# Patient Record
Sex: FEMALE | URBAN - METROPOLITAN AREA
[De-identification: names, ages, dates, MRNs, and addresses within clinical notes are randomized per-mention and may not be internally consistent; named-entity substitution may affect disease eponyms.]

---

## 2019-01-10 ENCOUNTER — APPOINTMENT (RX ONLY)
Dept: URBAN - METROPOLITAN AREA CLINIC 45 | Facility: CLINIC | Age: 24
Setting detail: DERMATOLOGY
End: 2019-01-10

## 2019-01-10 DIAGNOSIS — L40.0 PSORIASIS VULGARIS: ICD-10-CM

## 2019-01-10 DIAGNOSIS — L40.59 OTHER PSORIATIC ARTHROPATHY: ICD-10-CM

## 2019-01-10 PROBLEM — F41.9 ANXIETY DISORDER, UNSPECIFIED: Status: ACTIVE | Noted: 2019-01-10

## 2019-01-10 PROBLEM — F32.9 MAJOR DEPRESSIVE DISORDER, SINGLE EPISODE, UNSPECIFIED: Status: ACTIVE | Noted: 2019-01-10

## 2019-01-10 PROCEDURE — ? PRESCRIPTION

## 2019-01-10 PROCEDURE — ? OTHER

## 2019-01-10 PROCEDURE — ? COUNSELING

## 2019-01-10 PROCEDURE — ? ORDER TESTS

## 2019-01-10 PROCEDURE — ? HUMIRA INITIATION

## 2019-01-10 PROCEDURE — ? TREATMENT REGIMEN

## 2019-01-10 PROCEDURE — 99203 OFFICE O/P NEW LOW 30 MIN: CPT

## 2019-01-10 RX ORDER — CLOBETASOL PROPIONATE 0.46 MG/ML
1 SOLUTION TOPICAL QHS
Qty: 1 | Refills: 1 | Status: ERX | COMMUNITY
Start: 2019-01-10

## 2019-01-10 RX ORDER — TRIAMCINOLONE ACETONIDE 1 MG/G
1 CREAM TOPICAL BID
Qty: 1 | Refills: 2 | Status: ERX | COMMUNITY
Start: 2019-01-10

## 2019-01-10 RX ADMIN — CLOBETASOL PROPIONATE 1: 0.46 SOLUTION TOPICAL at 00:00

## 2019-01-10 RX ADMIN — TRIAMCINOLONE ACETONIDE 1: 1 CREAM TOPICAL at 00:00

## 2019-01-10 ASSESSMENT — LOCATION DETAILED DESCRIPTION DERM
LOCATION DETAILED: RIGHT THIRD METACARPOPHALANGEAL JOINT
LOCATION DETAILED: LEFT THIRD METACARPOPHALANGEAL JOINT
LOCATION DETAILED: LEFT DISTAL PRETIBIAL REGION
LOCATION DETAILED: RIGHT TIBIOTALAR JOINT
LOCATION DETAILED: LEFT CAPITOTRAPEZOID JOINT
LOCATION DETAILED: LEFT LABIUM MAJUS
LOCATION DETAILED: LEFT LATERAL ABDOMEN
LOCATION DETAILED: LEFT SECOND PROXIMAL INTERPHALANGEAL JOINT
LOCATION DETAILED: RIGHT LABIUM MAJUS
LOCATION DETAILED: RIGHT SECOND METATARSOPHALANGEAL JOINT
LOCATION DETAILED: LEFT SUPERIOR PARIETAL SCALP
LOCATION DETAILED: LEFT PATELLOFEMORAL JOINT
LOCATION DETAILED: RIGHT LUNOTRIQUETRAL JOINT
LOCATION DETAILED: RIGHT DISTAL PRETIBIAL REGION
LOCATION DETAILED: LEFT TIBIOTALAR JOINT

## 2019-01-10 ASSESSMENT — LOCATION SIMPLE DESCRIPTION DERM
LOCATION SIMPLE: RIGHT LUNOTRIQUETRAL
LOCATION SIMPLE: RIGHT TIBIOTALAR
LOCATION SIMPLE: LEFT SECOND PIPJ
LOCATION SIMPLE: LEFT TIBIOTALAR
LOCATION SIMPLE: SCALP
LOCATION SIMPLE: LEFT CAPITOTRAPEZOID
LOCATION SIMPLE: LEFT PATELLOFEMORAL
LOCATION SIMPLE: LABIA MAJORA
LOCATION SIMPLE: RIGHT SECOND MTP
LOCATION SIMPLE: RIGHT PRETIBIAL REGION
LOCATION SIMPLE: ABDOMEN
LOCATION SIMPLE: LEFT PRETIBIAL REGION
LOCATION SIMPLE: LEFT THIRD MP
LOCATION SIMPLE: RIGHT THIRD MP

## 2019-01-10 ASSESSMENT — LOCATION ZONE DERM
LOCATION ZONE: TOE
LOCATION ZONE: FINGER
LOCATION ZONE: ANKLE
LOCATION ZONE: KNEE
LOCATION ZONE: SCALP
LOCATION ZONE: TRUNK
LOCATION ZONE: VULVA
LOCATION ZONE: LEG
LOCATION ZONE: WRIST

## 2019-01-10 ASSESSMENT — BSA PSORIASIS: % BODY COVERED IN PSORIASIS: 13

## 2019-01-10 NOTE — PROCEDURE: ORDER TESTS
Expected Date Of Service: 01/10/2019
Lab Facility: [id number]
Bill For Surgical Tray: no
Performing Laboratory: [id number]
Billing Type: Third-Party Bill

## 2019-05-24 ENCOUNTER — RX ONLY (OUTPATIENT)
Age: 24
Setting detail: RX ONLY
End: 2019-05-24

## 2019-05-24 RX ORDER — ADALIMUMAB 40MG/0.4ML
KIT SUBCUTANEOUS
Qty: 1 | Refills: 1 | Status: ERX | COMMUNITY
Start: 2019-05-24

## 2019-05-24 RX ORDER — ADALIMUMAB 4 MG/ML
KIT INJECTION
Qty: 1 | Refills: 0 | Status: ERX | COMMUNITY
Start: 2019-05-24

## 2019-08-08 ENCOUNTER — APPOINTMENT (RX ONLY)
Dept: URBAN - METROPOLITAN AREA CLINIC 45 | Facility: CLINIC | Age: 24
Setting detail: DERMATOLOGY
End: 2019-08-08

## 2019-08-08 DIAGNOSIS — L40.0 PSORIASIS VULGARIS: ICD-10-CM | Status: WELL CONTROLLED

## 2019-08-08 DIAGNOSIS — L40.59 OTHER PSORIATIC ARTHROPATHY: ICD-10-CM | Status: IMPROVED

## 2019-08-08 PROCEDURE — ? TREATMENT REGIMEN

## 2019-08-08 PROCEDURE — ? ORDER TESTS

## 2019-08-08 PROCEDURE — ? COUNSELING

## 2019-08-08 PROCEDURE — ? HUMIRA INITIATION

## 2019-08-08 PROCEDURE — 99213 OFFICE O/P EST LOW 20 MIN: CPT

## 2019-08-08 ASSESSMENT — LOCATION DETAILED DESCRIPTION DERM
LOCATION DETAILED: LEFT SUPERIOR PARIETAL SCALP
LOCATION DETAILED: RIGHT LABIUM MAJUS
LOCATION DETAILED: RIGHT DISTAL PRETIBIAL REGION
LOCATION DETAILED: LEFT THIRD METACARPOPHALANGEAL JOINT
LOCATION DETAILED: LEFT PATELLOFEMORAL JOINT
LOCATION DETAILED: LEFT SECOND PROXIMAL INTERPHALANGEAL JOINT
LOCATION DETAILED: RIGHT SECOND METATARSOPHALANGEAL JOINT
LOCATION DETAILED: LEFT LATERAL ABDOMEN
LOCATION DETAILED: RIGHT THIRD METACARPOPHALANGEAL JOINT
LOCATION DETAILED: RIGHT LUNOTRIQUETRAL JOINT
LOCATION DETAILED: LEFT TIBIOTALAR JOINT
LOCATION DETAILED: LEFT DISTAL PRETIBIAL REGION
LOCATION DETAILED: LEFT LABIUM MAJUS
LOCATION DETAILED: LEFT CAPITOTRAPEZOID JOINT
LOCATION DETAILED: RIGHT TIBIOTALAR JOINT

## 2019-08-08 ASSESSMENT — LOCATION SIMPLE DESCRIPTION DERM
LOCATION SIMPLE: LEFT CAPITOTRAPEZOID
LOCATION SIMPLE: LEFT THIRD MP
LOCATION SIMPLE: RIGHT THIRD MP
LOCATION SIMPLE: LEFT TIBIOTALAR
LOCATION SIMPLE: LEFT SECOND PIPJ
LOCATION SIMPLE: RIGHT TIBIOTALAR
LOCATION SIMPLE: SCALP
LOCATION SIMPLE: LABIA MAJORA
LOCATION SIMPLE: LEFT PATELLOFEMORAL
LOCATION SIMPLE: RIGHT PRETIBIAL REGION
LOCATION SIMPLE: RIGHT SECOND MTP
LOCATION SIMPLE: RIGHT LUNOTRIQUETRAL
LOCATION SIMPLE: ABDOMEN
LOCATION SIMPLE: LEFT PRETIBIAL REGION

## 2019-08-08 ASSESSMENT — LOCATION ZONE DERM
LOCATION ZONE: TRUNK
LOCATION ZONE: ANKLE
LOCATION ZONE: LEG
LOCATION ZONE: KNEE
LOCATION ZONE: FINGER
LOCATION ZONE: SCALP
LOCATION ZONE: TOE
LOCATION ZONE: VULVA
LOCATION ZONE: WRIST

## 2019-08-08 NOTE — PROCEDURE: ORDER TESTS
Bill For Surgical Tray: no
Performing Laboratory: -[id number]
Lab Facility: [id number]
Expected Date Of Service: 08/08/2019
Billing Type: Third-Party Bill

## 2019-08-08 NOTE — PROCEDURE: TREATMENT REGIMEN
Action 3: Continue
Treatment 1: Humira 40 mg injected every other week
Detail Level: Zone
Continue Regimen: Triamcinolone BID PRN, Clobetasol solution BID PRN

## 2019-08-08 NOTE — PROCEDURE: MIPS QUALITY
Quality 131: Pain Assessment And Follow-Up: Pain assessment using a standardized tool is documented as negative, no follow-up plan required
Quality 130: Documentation Of Current Medications In The Medical Record: Current Medications Documented
Detail Level: Detailed
Quality 337: Tuberculosis Prevention For Psoriasis And Psoriatic Arthritis Patients On A Biological Immune Response Modifier: Patient has a documented negative annual TB screening.
Quality 402: Tobacco Use And Help With Quitting Among Adolescents: Patient screened for tobacco and never smoked

## 2019-08-29 ENCOUNTER — RX ONLY (OUTPATIENT)
Age: 24
Setting detail: RX ONLY
End: 2019-08-29

## 2019-08-29 RX ORDER — ADALIMUMAB 40MG/0.4ML
KIT SUBCUTANEOUS
Qty: 1 | Refills: 1 | Status: ERX

## 2019-11-14 ENCOUNTER — APPOINTMENT (RX ONLY)
Dept: URBAN - METROPOLITAN AREA CLINIC 45 | Facility: CLINIC | Age: 24
Setting detail: DERMATOLOGY
End: 2019-11-14

## 2019-11-14 DIAGNOSIS — L40.0 PSORIASIS VULGARIS: ICD-10-CM

## 2019-11-14 PROCEDURE — ? ORDER TESTS

## 2019-11-14 NOTE — PROCEDURE: ORDER TESTS
Billing Type: Third-Party Bill
Bill For Surgical Tray: no
Performing Laboratory: -[id number]
Lab Facility: [id number]
Expected Date Of Service: 11/14/2019